# Patient Record
Sex: FEMALE | URBAN - NONMETROPOLITAN AREA
[De-identification: names, ages, dates, MRNs, and addresses within clinical notes are randomized per-mention and may not be internally consistent; named-entity substitution may affect disease eponyms.]

---

## 2023-07-05 ENCOUNTER — APPOINTMENT (RX ONLY)
Dept: URBAN - METROPOLITAN AREA CLINIC 16 | Facility: CLINIC | Age: 48
Setting detail: DERMATOLOGY
End: 2023-07-05

## 2023-07-05 VITALS — WEIGHT: 130 LBS | HEIGHT: 65 IN

## 2023-07-05 DIAGNOSIS — L52 ERYTHEMA NODOSUM: ICD-10-CM | Status: INADEQUATELY CONTROLLED

## 2023-07-05 PROCEDURE — ? COUNSELING

## 2023-07-05 PROCEDURE — ? PRESCRIPTION MEDICATION MANAGEMENT

## 2023-07-05 PROCEDURE — 99204 OFFICE O/P NEW MOD 45 MIN: CPT

## 2023-07-05 ASSESSMENT — LOCATION DETAILED DESCRIPTION DERM
LOCATION DETAILED: RIGHT DISTAL PRETIBIAL REGION
LOCATION DETAILED: RIGHT PROXIMAL PRETIBIAL REGION

## 2023-07-05 ASSESSMENT — LOCATION ZONE DERM: LOCATION ZONE: LEG

## 2023-07-05 ASSESSMENT — LOCATION SIMPLE DESCRIPTION DERM: LOCATION SIMPLE: RIGHT PRETIBIAL REGION

## 2023-07-05 NOTE — PROCEDURE: COUNSELING
Patient Specific Counseling (Will Not Stick From Patient To Patient): Patient was previously in the hospital for colitis 1-2 weeks prior to lesions occurring.\\nMade her aware that lesions normally resolve within 6 weeks. Patient is diabetic and was being treated with oral prednisone. She had one dose left but said it was elevating her sugars so she discontinued it, but the lesions began to worsen again so she finished the oral prednisone. During the visit, Daphne confirmed that she did not check her sugar levels, she just assumed from the way she felt they were high. Patient stated that her pcp advised her to add an extra metformin while taking the prednisone.
Detail Level: Zone

## 2023-07-05 NOTE — PROCEDURE: MIPS QUALITY
Quality 111:Pneumonia Vaccination Status For Older Adults: Patient did not receive any pneumococcal conjugate or polysaccharide vaccine on or after their 60th birthday and before the end of the measurement period
Quality 128: Preventive Care And Screening: Body Mass Index (Bmi) Screening And Follow-Up Plan: BMI is documented within normal parameters and no follow-up plan is required.
Detail Level: Detailed
Quality 110: Preventive Care And Screening: Influenza Immunization: Influenza Immunization not Administered for Documented Reasons.
Quality 130: Documentation Of Current Medications In The Medical Record: Current Medications Documented
Quality 402: Tobacco Use And Help With Quitting Among Adolescents: Patient screened for tobacco and never smoked
Quality 431: Preventive Care And Screening: Unhealthy Alcohol Use - Screening: Patient not identified as an unhealthy alcohol user when screened for unhealthy alcohol use using a systematic screening method

## 2023-07-05 NOTE — HPI: SKIN LESION
Is This A New Presentation, Or A Follow-Up?: Skin Lesion Referral
How Severe Is Your Skin Lesion?: moderate
treated_been_treated
Who Is Your Referring Provider?: Destinee Whelan

## 2023-07-05 NOTE — PROCEDURE: PRESCRIPTION MEDICATION MANAGEMENT
Initiate Treatment: ABC compound cream called in to specialty pharmacy. Apply to lesions twice daily.  If discomfort of lesions do not improve, may add more steroids.  If steroids needed, will consult with PCP related to multiple health issues
Render In Strict Bullet Format?: No
Detail Level: Simple